# Patient Record
Sex: FEMALE | Race: WHITE | Employment: FULL TIME | URBAN - METROPOLITAN AREA
[De-identification: names, ages, dates, MRNs, and addresses within clinical notes are randomized per-mention and may not be internally consistent; named-entity substitution may affect disease eponyms.]

---

## 2024-11-18 ENCOUNTER — ANESTHESIA EVENT (OUTPATIENT)
Facility: HOSPITAL | Age: 64
End: 2024-11-18
Payer: COMMERCIAL

## 2024-11-18 ENCOUNTER — ANESTHESIA (OUTPATIENT)
Facility: HOSPITAL | Age: 64
End: 2024-11-18
Payer: COMMERCIAL

## 2024-11-18 ENCOUNTER — HOSPITAL ENCOUNTER (OUTPATIENT)
Facility: HOSPITAL | Age: 64
Setting detail: OUTPATIENT SURGERY
Discharge: HOME | End: 2024-11-18
Attending: PLASTIC SURGERY | Admitting: PLASTIC SURGERY
Payer: COMMERCIAL

## 2024-11-18 VITALS
HEART RATE: 68 BPM | WEIGHT: 121.25 LBS | SYSTOLIC BLOOD PRESSURE: 99 MMHG | RESPIRATION RATE: 18 BRPM | BODY MASS INDEX: 20.2 KG/M2 | HEIGHT: 65 IN | DIASTOLIC BLOOD PRESSURE: 66 MMHG | TEMPERATURE: 97.9 F | OXYGEN SATURATION: 98 %

## 2024-11-18 PROCEDURE — 6360000002 HC RX W HCPCS: Performed by: PLASTIC SURGERY

## 2024-11-18 PROCEDURE — 6370000000 HC RX 637 (ALT 250 FOR IP): Performed by: PLASTIC SURGERY

## 2024-11-18 PROCEDURE — 2500000003 HC RX 250 WO HCPCS: Performed by: PLASTIC SURGERY

## 2024-11-18 PROCEDURE — 2580000003 HC RX 258: Performed by: NURSE ANESTHETIST, CERTIFIED REGISTERED

## 2024-11-18 PROCEDURE — P9045 ALBUMIN (HUMAN), 5%, 250 ML: HCPCS | Performed by: NURSE ANESTHETIST, CERTIFIED REGISTERED

## 2024-11-18 PROCEDURE — 6360000002 HC RX W HCPCS: Performed by: NURSE ANESTHETIST, CERTIFIED REGISTERED

## 2024-11-18 PROCEDURE — 3700000001 HC ADD 15 MINUTES (ANESTHESIA): Performed by: PLASTIC SURGERY

## 2024-11-18 PROCEDURE — 3600000003 HC SURGERY LEVEL 3 BASE: Performed by: PLASTIC SURGERY

## 2024-11-18 PROCEDURE — 7100000000 HC PACU RECOVERY - FIRST 15 MIN: Performed by: PLASTIC SURGERY

## 2024-11-18 PROCEDURE — 2580000003 HC RX 258: Performed by: PLASTIC SURGERY

## 2024-11-18 PROCEDURE — 3700000000 HC ANESTHESIA ATTENDED CARE: Performed by: PLASTIC SURGERY

## 2024-11-18 PROCEDURE — A4217 STERILE WATER/SALINE, 500 ML: HCPCS | Performed by: PLASTIC SURGERY

## 2024-11-18 PROCEDURE — 2709999900 HC NON-CHARGEABLE SUPPLY: Performed by: PLASTIC SURGERY

## 2024-11-18 PROCEDURE — 7100000011 HC PHASE II RECOVERY - ADDTL 15 MIN: Performed by: PLASTIC SURGERY

## 2024-11-18 PROCEDURE — 7100000001 HC PACU RECOVERY - ADDTL 15 MIN: Performed by: PLASTIC SURGERY

## 2024-11-18 PROCEDURE — 2580000003 HC RX 258: Performed by: ANESTHESIOLOGY

## 2024-11-18 PROCEDURE — 3600000013 HC SURGERY LEVEL 3 ADDTL 15MIN: Performed by: PLASTIC SURGERY

## 2024-11-18 PROCEDURE — 7100000010 HC PHASE II RECOVERY - FIRST 15 MIN: Performed by: PLASTIC SURGERY

## 2024-11-18 PROCEDURE — 2500000003 HC RX 250 WO HCPCS: Performed by: NURSE ANESTHETIST, CERTIFIED REGISTERED

## 2024-11-18 RX ORDER — ATORVASTATIN CALCIUM 20 MG/1
1 TABLET, FILM COATED ORAL DAILY
COMMUNITY
Start: 2024-07-02

## 2024-11-18 RX ORDER — ALBUMIN HUMAN 50 G/1000ML
SOLUTION INTRAVENOUS
Status: DISCONTINUED | OUTPATIENT
Start: 2024-11-18 | End: 2024-11-18 | Stop reason: SDUPTHER

## 2024-11-18 RX ORDER — DEXAMETHASONE SODIUM PHOSPHATE 4 MG/ML
INJECTION, SOLUTION INTRA-ARTICULAR; INTRALESIONAL; INTRAMUSCULAR; INTRAVENOUS; SOFT TISSUE
Status: DISCONTINUED | OUTPATIENT
Start: 2024-11-18 | End: 2024-11-18 | Stop reason: SDUPTHER

## 2024-11-18 RX ORDER — LIDOCAINE HYDROCHLORIDE 10 MG/ML
1 INJECTION, SOLUTION EPIDURAL; INFILTRATION; INTRACAUDAL; PERINEURAL
Status: DISCONTINUED | OUTPATIENT
Start: 2024-11-18 | End: 2024-11-18 | Stop reason: HOSPADM

## 2024-11-18 RX ORDER — GINSENG 100 MG
CAPSULE ORAL PRN
Status: DISCONTINUED | OUTPATIENT
Start: 2024-11-18 | End: 2024-11-18 | Stop reason: ALTCHOICE

## 2024-11-18 RX ORDER — PROPOFOL 10 MG/ML
INJECTION, EMULSION INTRAVENOUS
Status: DISCONTINUED | OUTPATIENT
Start: 2024-11-18 | End: 2024-11-18 | Stop reason: SDUPTHER

## 2024-11-18 RX ORDER — MIDAZOLAM HYDROCHLORIDE 1 MG/ML
INJECTION, SOLUTION INTRAMUSCULAR; INTRAVENOUS
Status: DISCONTINUED | OUTPATIENT
Start: 2024-11-18 | End: 2024-11-18 | Stop reason: SDUPTHER

## 2024-11-18 RX ORDER — PHENYLEPHRINE HCL IN 0.9% NACL 0.4MG/10ML
SYRINGE (ML) INTRAVENOUS
Status: DISCONTINUED | OUTPATIENT
Start: 2024-11-18 | End: 2024-11-18 | Stop reason: SDUPTHER

## 2024-11-18 RX ORDER — SODIUM CHLORIDE 9 MG/ML
INJECTION, SOLUTION INTRAVENOUS CONTINUOUS
Status: DISCONTINUED | OUTPATIENT
Start: 2024-11-18 | End: 2024-11-18 | Stop reason: HOSPADM

## 2024-11-18 RX ORDER — OXYCODONE HYDROCHLORIDE 5 MG/1
5 TABLET ORAL
Status: DISCONTINUED | OUTPATIENT
Start: 2024-11-18 | End: 2024-11-18 | Stop reason: HOSPADM

## 2024-11-18 RX ORDER — FAMOTIDINE 10 MG/ML
INJECTION, SOLUTION INTRAVENOUS
Status: DISCONTINUED | OUTPATIENT
Start: 2024-11-18 | End: 2024-11-18 | Stop reason: SDUPTHER

## 2024-11-18 RX ORDER — SUCCINYLCHOLINE/SOD CL,ISO/PF 100 MG/5ML
SYRINGE (ML) INTRAVENOUS
Status: DISCONTINUED | OUTPATIENT
Start: 2024-11-18 | End: 2024-11-18 | Stop reason: SDUPTHER

## 2024-11-18 RX ORDER — DROPERIDOL 2.5 MG/ML
0.62 INJECTION, SOLUTION INTRAMUSCULAR; INTRAVENOUS
Status: DISCONTINUED | OUTPATIENT
Start: 2024-11-18 | End: 2024-11-18 | Stop reason: HOSPADM

## 2024-11-18 RX ORDER — ACETAMINOPHEN 500 MG
1000 TABLET ORAL ONCE
Status: COMPLETED | OUTPATIENT
Start: 2024-11-18 | End: 2024-11-18

## 2024-11-18 RX ORDER — SODIUM CHLORIDE, SODIUM LACTATE, POTASSIUM CHLORIDE, CALCIUM CHLORIDE 600; 310; 30; 20 MG/100ML; MG/100ML; MG/100ML; MG/100ML
INJECTION, SOLUTION INTRAVENOUS
Status: DISCONTINUED | OUTPATIENT
Start: 2024-11-18 | End: 2024-11-18 | Stop reason: SDUPTHER

## 2024-11-18 RX ORDER — SCOLOPAMINE TRANSDERMAL SYSTEM 1 MG/1
1 PATCH, EXTENDED RELEASE TRANSDERMAL ONCE
Status: DISCONTINUED | OUTPATIENT
Start: 2024-11-18 | End: 2024-11-18 | Stop reason: HOSPADM

## 2024-11-18 RX ORDER — DIPHENHYDRAMINE HYDROCHLORIDE 50 MG/ML
12.5 INJECTION INTRAMUSCULAR; INTRAVENOUS
Status: DISCONTINUED | OUTPATIENT
Start: 2024-11-18 | End: 2024-11-18 | Stop reason: HOSPADM

## 2024-11-18 RX ORDER — ALBUTEROL SULFATE 0.83 MG/ML
2.5 SOLUTION RESPIRATORY (INHALATION)
Status: DISCONTINUED | OUTPATIENT
Start: 2024-11-18 | End: 2024-11-18 | Stop reason: HOSPADM

## 2024-11-18 RX ORDER — HYDROMORPHONE HYDROCHLORIDE 1 MG/ML
1 INJECTION, SOLUTION INTRAMUSCULAR; INTRAVENOUS; SUBCUTANEOUS EVERY 5 MIN PRN
Status: DISCONTINUED | OUTPATIENT
Start: 2024-11-18 | End: 2024-11-18 | Stop reason: HOSPADM

## 2024-11-18 RX ORDER — ESTRADIOL AND NORETHINDRONE ACETATE 1; .5 MG/1; MG/1
1 TABLET ORAL DAILY
COMMUNITY
Start: 2024-09-06 | End: 2025-09-06

## 2024-11-18 RX ORDER — ONDANSETRON 2 MG/ML
INJECTION INTRAMUSCULAR; INTRAVENOUS
Status: DISCONTINUED | OUTPATIENT
Start: 2024-11-18 | End: 2024-11-18 | Stop reason: SDUPTHER

## 2024-11-18 RX ORDER — FENTANYL CITRATE 50 UG/ML
INJECTION, SOLUTION INTRAMUSCULAR; INTRAVENOUS
Status: DISCONTINUED | OUTPATIENT
Start: 2024-11-18 | End: 2024-11-18 | Stop reason: SDUPTHER

## 2024-11-18 RX ORDER — LEVOTHYROXINE SODIUM 25 UG/1
1 TABLET ORAL
COMMUNITY
Start: 2024-07-02

## 2024-11-18 RX ORDER — ROCURONIUM BROMIDE 10 MG/ML
INJECTION, SOLUTION INTRAVENOUS
Status: DISCONTINUED | OUTPATIENT
Start: 2024-11-18 | End: 2024-11-18 | Stop reason: SDUPTHER

## 2024-11-18 RX ORDER — NALOXONE HYDROCHLORIDE 0.4 MG/ML
INJECTION, SOLUTION INTRAMUSCULAR; INTRAVENOUS; SUBCUTANEOUS PRN
Status: DISCONTINUED | OUTPATIENT
Start: 2024-11-18 | End: 2024-11-18 | Stop reason: HOSPADM

## 2024-11-18 RX ORDER — ACETAMINOPHEN 325 MG/1
650 TABLET ORAL ONCE
Status: DISCONTINUED | OUTPATIENT
Start: 2024-11-18 | End: 2024-11-18 | Stop reason: SDUPTHER

## 2024-11-18 RX ORDER — MEPERIDINE HYDROCHLORIDE 25 MG/ML
12.5 INJECTION INTRAMUSCULAR; INTRAVENOUS; SUBCUTANEOUS EVERY 5 MIN PRN
Status: DISCONTINUED | OUTPATIENT
Start: 2024-11-18 | End: 2024-11-18 | Stop reason: HOSPADM

## 2024-11-18 RX ORDER — IPRATROPIUM BROMIDE AND ALBUTEROL SULFATE 2.5; .5 MG/3ML; MG/3ML
1 SOLUTION RESPIRATORY (INHALATION)
Status: DISCONTINUED | OUTPATIENT
Start: 2024-11-18 | End: 2024-11-18 | Stop reason: HOSPADM

## 2024-11-18 RX ORDER — GABAPENTIN 300 MG/1
300 CAPSULE ORAL ONCE
Status: COMPLETED | OUTPATIENT
Start: 2024-11-18 | End: 2024-11-18

## 2024-11-18 RX ORDER — VANCOMYCIN HYDROCHLORIDE 1 G/20ML
INJECTION, POWDER, LYOPHILIZED, FOR SOLUTION INTRAVENOUS PRN
Status: DISCONTINUED | OUTPATIENT
Start: 2024-11-18 | End: 2024-11-18 | Stop reason: ALTCHOICE

## 2024-11-18 RX ORDER — LABETALOL HYDROCHLORIDE 5 MG/ML
10 INJECTION, SOLUTION INTRAVENOUS
Status: DISCONTINUED | OUTPATIENT
Start: 2024-11-18 | End: 2024-11-18 | Stop reason: HOSPADM

## 2024-11-18 RX ADMIN — SODIUM CHLORIDE, POTASSIUM CHLORIDE, SODIUM LACTATE AND CALCIUM CHLORIDE: 600; 310; 30; 20 INJECTION, SOLUTION INTRAVENOUS at 12:17

## 2024-11-18 RX ADMIN — PROPOFOL 130 MCG/KG/MIN: 10 INJECTION, EMULSION INTRAVENOUS at 13:39

## 2024-11-18 RX ADMIN — PROPOFOL 130 MCG/KG/MIN: 10 INJECTION, EMULSION INTRAVENOUS at 09:56

## 2024-11-18 RX ADMIN — GABAPENTIN 300 MG: 300 CAPSULE ORAL at 06:36

## 2024-11-18 RX ADMIN — PROPOFOL 100 MG: 10 INJECTION, EMULSION INTRAVENOUS at 07:41

## 2024-11-18 RX ADMIN — PROPOFOL 50 MG: 10 INJECTION, EMULSION INTRAVENOUS at 14:21

## 2024-11-18 RX ADMIN — PROPOFOL 50 MG: 10 INJECTION, EMULSION INTRAVENOUS at 07:51

## 2024-11-18 RX ADMIN — Medication 20 MCG: at 08:10

## 2024-11-18 RX ADMIN — PROPOFOL 130 MCG/KG/MIN: 10 INJECTION, EMULSION INTRAVENOUS at 11:15

## 2024-11-18 RX ADMIN — ONDANSETRON 4 MG: 2 INJECTION, SOLUTION INTRAMUSCULAR; INTRAVENOUS at 07:34

## 2024-11-18 RX ADMIN — DEXAMETHASONE SODIUM PHOSPHATE 8 MG: 4 INJECTION, SOLUTION INTRAMUSCULAR; INTRAVENOUS at 08:02

## 2024-11-18 RX ADMIN — FENTANYL CITRATE 50 MCG: 50 INJECTION, SOLUTION INTRAMUSCULAR; INTRAVENOUS at 07:56

## 2024-11-18 RX ADMIN — PROPOFOL 140 MCG/KG/MIN: 10 INJECTION, EMULSION INTRAVENOUS at 07:40

## 2024-11-18 RX ADMIN — FAMOTIDINE 20 MG: 10 INJECTION, SOLUTION INTRAVENOUS at 07:34

## 2024-11-18 RX ADMIN — SODIUM CHLORIDE: 9 INJECTION, SOLUTION INTRAVENOUS at 07:33

## 2024-11-18 RX ADMIN — ACETAMINOPHEN 1000 MG: 500 TABLET ORAL at 06:36

## 2024-11-18 RX ADMIN — Medication 80 MG: at 07:42

## 2024-11-18 RX ADMIN — PROPOFOL 140 MCG/KG/MIN: 10 INJECTION, EMULSION INTRAVENOUS at 08:45

## 2024-11-18 RX ADMIN — MIDAZOLAM HYDROCHLORIDE 1 MG: 1 INJECTION, SOLUTION INTRAMUSCULAR; INTRAVENOUS at 12:23

## 2024-11-18 RX ADMIN — WATER 2000 MG: 1 INJECTION INTRAMUSCULAR; INTRAVENOUS; SUBCUTANEOUS at 12:05

## 2024-11-18 RX ADMIN — WATER 2000 MG: 1 INJECTION INTRAMUSCULAR; INTRAVENOUS; SUBCUTANEOUS at 08:05

## 2024-11-18 RX ADMIN — ALBUMIN (HUMAN) 12.5 G: 12.5 INJECTION, SOLUTION INTRAVENOUS at 08:29

## 2024-11-18 RX ADMIN — Medication 20 MCG: at 08:15

## 2024-11-18 RX ADMIN — Medication 20 MCG: at 08:06

## 2024-11-18 RX ADMIN — MIDAZOLAM HYDROCHLORIDE 1 MG: 1 INJECTION, SOLUTION INTRAMUSCULAR; INTRAVENOUS at 09:05

## 2024-11-18 RX ADMIN — PROPOFOL 130 MCG/KG/MIN: 10 INJECTION, EMULSION INTRAVENOUS at 12:27

## 2024-11-18 RX ADMIN — ROCURONIUM BROMIDE 10 MG: 10 INJECTION, SOLUTION INTRAVENOUS at 07:41

## 2024-11-18 RX ADMIN — FENTANYL CITRATE 25 MCG: 50 INJECTION, SOLUTION INTRAMUSCULAR; INTRAVENOUS at 14:09

## 2024-11-18 RX ADMIN — MIDAZOLAM HYDROCHLORIDE 0.5 MG: 1 INJECTION, SOLUTION INTRAMUSCULAR; INTRAVENOUS at 13:39

## 2024-11-18 RX ADMIN — FENTANYL CITRATE 25 MCG: 50 INJECTION, SOLUTION INTRAMUSCULAR; INTRAVENOUS at 13:37

## 2024-11-18 RX ADMIN — FENTANYL CITRATE 50 MCG: 50 INJECTION, SOLUTION INTRAMUSCULAR; INTRAVENOUS at 07:41

## 2024-11-18 RX ADMIN — Medication 40 MCG: at 07:58

## 2024-11-18 RX ADMIN — MIDAZOLAM HYDROCHLORIDE 2 MG: 1 INJECTION, SOLUTION INTRAMUSCULAR; INTRAVENOUS at 07:33

## 2024-11-18 ASSESSMENT — PAIN - FUNCTIONAL ASSESSMENT: PAIN_FUNCTIONAL_ASSESSMENT: 0-10

## 2024-11-18 NOTE — DISCHARGE INSTRUCTIONS
POST-SURGERY INSTRUCTIONS  INSTRUCTIONS FOR THE CAREGIVER     As the designated caregiver, you have several very important responsibilities.  Use this checklist to keep the patient's recovery on schedule.  ___The patient must rest and walk.  While rest is important in the early stages of healing, equally important is that the patient is ambulatory: meaning that he/she is walking under his/her own strength. The patient must spend 10 minutes every 2 hours engaged in light walking indoors each day, beginning with the day of surgery.  Walk behind or beside the patient in the event he/she becomes unstable or light-headed.   ___ The patient must recline on pillows, do not lie down flat in bed.  This will be more comfortable and can reduce swelling.  ___ Administer all medication, exactly as prescribed.  ___ Apply cool, not cold compresses to the treated region to alleviate discomfort, swelling or bruising. No heating pads.  Crushed ice or ice packs must be wrapped in a towel before being applied to the skin.  Do not apply ice or anything frozen directly to the skin.  Apply cool compresses, for no longer than 20-minute intervals.  ___Fluids are critical following surgery. Make certain the patient drinks plenty of non-carbonated, non-alcoholic, caffeine-free and green tea-free beverages including fruit juices and water, milk and yogurt drinks.  He/she must consume at least 8 ounces of fluid every 2 hours.  ___Good nutrition is important during recovery. Constipation and bloating are not uncommon after surgery. This can be improved by increasing water intake, restricting salt intake, and eating a nutritious, bland diet of complex carbohydrates such as whole grain pastas, breads and cereals.  An over the counter stool softener can be started before surgery.  If you have constipation Dulcolax over the counter can be taken as directed.   ___Keep incisions and dressings clean and dry. Incisions may seep fluid and some blood

## 2024-11-18 NOTE — ANESTHESIA PRE PROCEDURE
Department of Anesthesiology  Preprocedure Note       Name:  Rita Figueredo   Age:  64 y.o.  :  1960                                          MRN:  [id number]         Date:  2024      Surgeon: Surgeon(s):  Nelson Barney MD    Procedure: Procedure(s):  FACE LIFT    Medications prior to admission:   Prior to Admission medications    Medication Sig Start Date End Date Taking? Authorizing Provider   levothyroxine (SYNTHROID) 25 MCG tablet Take 1 tablet by mouth every morning (before breakfast) 24  Yes Dougie Heller MD   atorvastatin (LIPITOR) 20 MG tablet Take 1 tablet by mouth daily 24  Yes ProviderDougie MD   estradiol-norethindrone (ACTIVELLA) 1-0.5 MG per tablet Take 1 tablet by mouth daily 24 Yes ProviderDougie MD       Current medications:    Current Facility-Administered Medications   Medication Dose Route Frequency Provider Last Rate Last Admin   • lidocaine PF 1 % injection 1 mL  1 mL IntraDERmal Once PRN Alan Berumen DO       • albuterol (PROVENTIL) (2.5 MG/3ML) 0.083% nebulizer solution 2.5 mg  2.5 mg Nebulization Once PRN Alan Berumen DO       • 0.9 % sodium chloride infusion   IntraVENous Continuous Alan Berumen DO       • ceFAZolin (ANCEF) 2,000 mg in sterile water 20 mL IV syringe  2,000 mg IntraVENous Once Nelson Barney MD       • scopolamine (TRANSDERM-SCOP) transdermal patch 1 patch  1 patch TransDERmal Once Nelson Barney MD   1 patch at 24 0636       Allergies:  No Known Allergies    Problem List:  There is no problem list on file for this patient.      Past Medical History:  History reviewed. No pertinent past medical history.    Past Surgical History:  History reviewed. No pertinent surgical history.    Social History:    Social History     Tobacco Use   • Smoking status: Not on file   • Smokeless tobacco: Not on file   Substance Use Topics   • Alcohol use: Not on file

## 2024-11-18 NOTE — OP NOTE
prepared and draped in sterile fashion.  First started with the right side of the face, a standard preauricular incision was marked starting at the sideburn, following the contour of the anterior ear down at the infralobular crease and postauricularly curving down to the occipital hairline.  Incision was infiltrated with lidocaine with epinephrine.  The incision was made with a #15 blade.  Then, using double hook and a #15 blade, the initial plane was elevated in the preauricular area staying on top of the SMAS in the subcutaneous plane.  Then, continuous elevation of the flap continued with Gorney scissors with gentle traction.  Once we were satisfied with the subcutaneous elevation, we proceeded further with subcutaneous elevation postauricularly on top of the fascia of the sternocleidomastoid inferiorly.  During subcutaneous dissection, once the posterior edge of the platysma was identified and the lateral edge of the orbicularis oculi was identified, the subcutaneous dissection was stopped.  This was the entry point for the deep plane elevation for sub-SMAS dissection.  Using a #10 blade after the SMAS was elevated, small incision was made in the SMAS overlying the inferior premasseter space.  It was confirmed that this was the right plane.  Then, using a Trepsat elevator, inferior premasseter space was bluntly and atraumatically developed.  Dissection proceeded anteriorly to the area of the jowling.  Next, along the deep plane superior to that, another incision was made in the SMAS.  The sub-SMAS plane was elevated and the superior premasseter space was elevated with Trepsat dissector.  Next, the long incision was made again in the SMAS and the prezygomatic space was partially developed with a Trepsat elevator.  All dissection was done atraumatically.  There was minimal release of the zygomatic ligament.  The mandibular cutaneous ligament was released and masseteric ligament was released.  Further, at this

## 2024-11-18 NOTE — H&P
History and Physical    Patient is a 64 y.o. well-developed, well nourished female who presents for facelift.    History reviewed. No pertinent past medical history.  History reviewed. No pertinent surgical history.  Prior to Admission medications    Medication Sig Start Date End Date Taking? Authorizing Provider   levothyroxine (SYNTHROID) 25 MCG tablet Take 1 tablet by mouth every morning (before breakfast) 7/2/24  Yes Dougie Heller MD   atorvastatin (LIPITOR) 20 MG tablet Take 1 tablet by mouth daily 7/2/24  Yes ProviderDougie MD   estradiol-norethindrone (ACTIVELLA) 1-0.5 MG per tablet Take 1 tablet by mouth daily 9/6/24 9/6/25 Yes ProviderDougie MD     No Known Allergies    General:   No apparent distress.    Heart:  Regular rate and rhythm without murmurs or rubs.    Lungs:  Clear to ausculation bilaterally; no wheezes, rales or rhonchi.    Patient is ready to go to surgery.    Nelson Barney MD  11/18/2024

## 2024-11-18 NOTE — ANESTHESIA POSTPROCEDURE EVALUATION
Department of Anesthesiology  Postprocedure Note    Patient: Rita Figueredo  MRN: [id number]  YOB: 1960  Date of evaluation: 11/18/2024    Procedure Summary       Date: 11/18/24 Room / Location: [facility] MAIN OR  / [facility] MAIN OR    Anesthesia Start: 0733 Anesthesia Stop: 1447    Procedure: FACE LIFT (Face) Diagnosis:       Facial skin atrophy      (Facial skin atrophy [L90.9])    Surgeons: Nelson Barney MD Responsible Provider: Anderson Ambriz MD    Anesthesia Type: General, TIVA ASA Status: 2            Anesthesia Type: General, TIVA    Martínez Phase I: Martínez Score: 8    Martínez Phase II:      Anesthesia Post Evaluation    No notable events documented.

## (undated) DEVICE — GLOVE SURG SZ 65 L12IN FNGR THK79MIL GRN LTX FREE

## (undated) DEVICE — MICRODISSECTION NEEDLE STRAIGHT SLEEVE: Brand: COLORADO

## (undated) DEVICE — 3M™ TEGADERM™ TRANSPARENT FILM DRESSING FRAME STYLE, 1624W, 2-3/8 IN X 2-3/4 IN (6 CM X 7 CM), 100/CT 4CT/CASE: Brand: 3M™ TEGADERM™

## (undated) DEVICE — BLADE ES L6IN ELASTOMERIC COAT INSUL DURABLE BEND UPTO

## (undated) DEVICE — SUTURE PERMAHAND SZ 2-0 L30IN NONABSORBABLE BLK L17MM BB K883H

## (undated) DEVICE — SPONGE GZ W4XL4IN COT RADPQ HIGHLY ABSRB STERILE

## (undated) DEVICE — DRAIN SURG 10FR L1/8IN DIA3.2MM SIL CHN RND FULL FLUT TRCR

## (undated) DEVICE — COUNTER NDL FOAM MAG DBL COUNT

## (undated) DEVICE — Device

## (undated) DEVICE — SUTURE ETHILON SZ 2-0 L18IN NONABSORBABLE BLK L19MM PS-2 PRIM 593H

## (undated) DEVICE — DECANTER BAG 9": Brand: MEDLINE INDUSTRIES, INC.

## (undated) DEVICE — SPONGE GZ W4XL4IN COT 12 PLY TYP VII WVN C FLD DSGN STERILE

## (undated) DEVICE — SOLUTION SCRB 2OZ 10% POVIDONE IOD ANTIMIC BTL

## (undated) DEVICE — RESERVOIR,SUCTION,100CC,SILICONE: Brand: MEDLINE

## (undated) DEVICE — DRAPE FLD WRM W44XL66IN C6L FOR INTRATEMP SYS THERMABASIN

## (undated) DEVICE — DRESSING,GAUZE,XEROFORM,CURAD,5"X9",ST: Brand: CURAD

## (undated) DEVICE — 1LYRTR 16FR10ML100%SILTMPS SNP: Brand: MEDLINE INDUSTRIES, INC.

## (undated) DEVICE — CONTAINER,SPECIMEN,3OZ,OR STRL: Brand: MEDLINE

## (undated) DEVICE — SUTURE PROL SZ 6-0 L18IN NONABSORBABLE BLU L36MM P-1 3/8 8697G

## (undated) DEVICE — GAUZE,SPONGE,FLUFF,6"X6.75",STRL,5/TRAY: Brand: MEDLINE

## (undated) DEVICE — SUTURE PDS II SZ 4-0 L18IN ABSRB UD P-3 L13MM 3/8 CIR PRIM Z494G

## (undated) DEVICE — BOWL MED L 32OZ PLAS W/ MOLD GRAD EZ OPN PEEL PCH

## (undated) DEVICE — NEEDLE HYPO 27GA L0.5IN GRY POLYPR HUB S STL REG BVL STR

## (undated) DEVICE — GLOVE SURG SZ 6 THK91MIL LTX FREE SYN POLYISOPRENE ANTI

## (undated) DEVICE — MARKER,SKIN,WI/RULER AND LABELS: Brand: MEDLINE

## (undated) DEVICE — SUTURE PROL SZ 5-0 L18IN NONABSORBABLE BLU L19MM PS-2 3/8 8686G

## (undated) DEVICE — SOLUTION IRRIG 1000ML STRL H2O USP PLAS POUR BTL

## (undated) DEVICE — SUTURE VICRYL + SZ 3-0 L27IN ABSRB UD L26MM SH 1/2 CIR VCP416H

## (undated) DEVICE — PENCIL ES CRD L10FT HND SWCHING ROCK SWCH W/ EDGE COAT BLDE

## (undated) DEVICE — BLADE,CARBON-STEEL,10,STRL,DISPOSABLE,TB: Brand: MEDLINE

## (undated) DEVICE — NEEDLE SPNL L3.5IN PNK HUB S STL REG WALL FIT STYL W/ QNCKE

## (undated) DEVICE — CORD ES L12FT BPLR FRCP

## (undated) DEVICE — BANDAGE COMPR COHESIVE 5 YDX4 IN SELF ADH TAN NS COBAN

## (undated) DEVICE — TOWEL,OR,DSP,ST,BLUE,STD,4/PK,20PK/CS: Brand: MEDLINE

## (undated) DEVICE — SUTURE PERMA-HAND SZ 2-0 L30IN NONABSORBABLE BLK L26MM SH K833H

## (undated) DEVICE — STRIP SKIN CLSR W0.25XL4IN WHT SPUNBOUND FBR NYL HI ADH

## (undated) DEVICE — COUNTER NDL 20 COUNT HLD 40 DBL MAG ADH

## (undated) DEVICE — DRAIN SURG 10FR PVC TB W/ TRCR MID PERF NO RESVR HUBLESS

## (undated) DEVICE — SUTURE MONOCRYL + SZ 4-0 L27IN ABSRB UD L19MM PS-2 3/8 CIR MCP426H

## (undated) DEVICE — BLANKET WRM W35.4XL86.6IN FULL UNDERBODY + FORC AIR

## (undated) DEVICE — SHEET,DRAPE,UNDERBUTTOCK,GRAD POUCH,PORT: Brand: MEDLINE

## (undated) DEVICE — SUTURE MONOCRYL + SZ 3-0 L27IN ABSRB UD L26MM SH 1/2 CIR MCP416H

## (undated) DEVICE — SUTURE PLN GUT SZ 5-0 L18IN ABSRB YELLOWISH TAN L13MM PC-1 1915G

## (undated) DEVICE — SUTURE VICRYL + SZ 2-0 L27IN ABSRB WHT SH 1/2 CIR TAPERCUT VCP417H

## (undated) DEVICE — MINOR ENT-SFMCASU: Brand: MEDLINE INDUSTRIES, INC.

## (undated) DEVICE — SUTURE PROL SZ 6-0 L18IN NONABSORBABLE BLU P-3 L13MM 3/8 8695G

## (undated) DEVICE — SOLUTION IRRIG 1000ML 0.9% SOD CHL USP POUR PLAS BTL

## (undated) DEVICE — SPONGE LAP W18XL18IN WHT COT 4 PLY FLD STRUNG RADPQ DISP ST 2 PER PACK

## (undated) DEVICE — SUTURE MONOCRYL SZ 5-0 L18IN ABSRB UD L13MM P-3 3/8 CIR PRIM Y493G

## (undated) DEVICE — DRESSING,GAUZE,XEROFORM,CURAD,1"X8",ST: Brand: CURAD

## (undated) DEVICE — SYRINGE MED 10ML LUERLOCK TIP W/O SFTY DISP

## (undated) DEVICE — MARKER SKIN GENTIAN VLT FN TIP W/ 6IN RUL AND 6 LBL

## (undated) DEVICE — BLADE, TONGUE, 6", STERILE: Brand: MEDLINE

## (undated) DEVICE — TUBING SUCT 10FR MAL ALUM SHFT FN CAP VENT UNIV CONN W/ OBT